# Patient Record
Sex: MALE | Race: WHITE
[De-identification: names, ages, dates, MRNs, and addresses within clinical notes are randomized per-mention and may not be internally consistent; named-entity substitution may affect disease eponyms.]

---

## 2020-08-05 ENCOUNTER — HOSPITAL ENCOUNTER (EMERGENCY)
Dept: HOSPITAL 74 - JER | Age: 25
Discharge: HOME | End: 2020-08-05
Payer: COMMERCIAL

## 2020-08-05 VITALS — HEART RATE: 89 BPM | DIASTOLIC BLOOD PRESSURE: 64 MMHG | SYSTOLIC BLOOD PRESSURE: 132 MMHG

## 2020-08-05 VITALS — BODY MASS INDEX: 18.1 KG/M2

## 2020-08-05 VITALS — TEMPERATURE: 98.5 F
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
## 2020-08-05 DIAGNOSIS — S43.004A: Primary | ICD-10-CM

## 2020-08-05 PROCEDURE — 3E033GC INTRODUCTION OF OTHER THERAPEUTIC SUBSTANCE INTO PERIPHERAL VEIN, PERCUTANEOUS APPROACH: ICD-10-PCS | Performed by: STUDENT IN AN ORGANIZED HEALTH CARE EDUCATION/TRAINING PROGRAM

## 2020-08-05 PROCEDURE — 0RSJXZZ REPOSITION RIGHT SHOULDER JOINT, EXTERNAL APPROACH: ICD-10-PCS | Performed by: STUDENT IN AN ORGANIZED HEALTH CARE EDUCATION/TRAINING PROGRAM

## 2020-08-05 NOTE — PDOC
Documentation entered by Kristine He SCRIBE, acting as scribe for 

Sneha Max MD.








Sneha Max MD:  This documentation has been prepared by the scribeNeri Ana, SCRIBE, under my direction and personally reviewed by me in its 

entirety.  I confirm that the documentation accurately reflects all work, 

treatment, procedures, and medical decision making performed by me.  





History of Present Illness





- General


Chief Complaint: Shoulder Dislocation


Stated Complaint: RT SHOULDER INJURY/PAIN


Time Seen by Provider: 08/05/20 11:19


History Source: Patient


Exam Limitations: No Limitations





- History of Present Illness


Initial Comments: 





08/05/20 11:31


Patient is a 25 year old male with a significant past medical history of brain 

tumor, epilepsy disorder, and multiple shoulder dislocation, who presents to the

ED with shoulder pain since 4 days ago. Patient stated he had a seizure on 

Saturday (4 days ago) and hit his shoulder. Patient disclosed he is in "a lot of

pain" and cannot move his arm. Patient said he writes with his right hand but is

left hand dominant. 





Patient denies: headache, SOB, chest pain, abdominal pain, or any other related 

symptoms.





Allergies: NKDA

















Past History





- Medical History


Allergies/Adverse Reactions: 


                                    Allergies











Allergy/AdvReac Type Severity Reaction Status Date / Time


 


No Known Allergies Allergy   Verified 08/05/20 11:08











Home Medications: 


Ambulatory Orders





levETIRAcetam [Keppra -] 500 mg PO BID 11/03/16 








COPD: No


Seizures: Yes





- Immunization History


Immunization Up to Date: Yes





- Psycho-Social/Smoking History


Smoking History: Never smoked


Have you smoked in the past 12 months: No


Information on smoking cessation initiated: No





- Substance Abuse Hx (Audit-C & DAST Scrn)


How often the patient has a drink containing alcohol: 2-4 times / month


Number of drinks the patient has on a typical day: 3 or 4


How often the patient has six or more drinks on one occasion: Never


Score: In Men: 4 or > Positive; In Women: 3 or > Positive: 3


Screen Result (Pos requires Nsg. Audit-10AR): Negative


In the last yr the pt used illegal drug/Rx for NonMed reason: No


Score:  Yes response is considered Positive: 0


Screen Result (Positive result requires Nsg. DAST-10): Negative





**Review of Systems





- Review of Systems


Able to Perform ROS?: Yes


Comments:: 





08/05/20 11:33


GENERAL/CONSTITUTIONAL: No fever or chills. No weakness.


HEAD, EYES, EARS, NOSE AND THROAT: No change in vision. No ear pain or 

discharge. No sore throat.


CARDIOVASCULAR: No chest pain or shortness of breath.


RESPIRATORY: No cough, wheezing, or hemoptysis.


GASTROINTESTINAL: No nausea, vomiting, diarrhea or constipation.


GENITOURINARY: No dysuria, frequency, or change in urination.


MUSCULOSKELETAL: +Shoulder pain. No neck or back pain.


SKIN: No rash


NEUROLOGIC: +Seizures. No headache, vertigo, loss of consciousness.


ENDOCRINE: No increased thirst. No abnormal weight change.


HEMATOLOGIC/LYMPHATIC: No anemia, easy bleeding, or history of blood clots.


ALLERGIC/IMMUNOLOGIC: No hives or skin allergy.




















*Physical Exam





- Vital Signs


                                Last Vital Signs











Temp Pulse Resp BP Pulse Ox


 


 98.5 F   73   18   122/84   100 


 


 08/05/20 11:05  08/05/20 11:05  08/05/20 11:05  08/05/20 11:05  08/05/20 11:05














- Physical Exam





08/05/20 11:34


GENERAL: Awake, alert, and fully oriented, in no acute distress


HEAD: No signs of trauma


EYES: EOMI, sclera anicteric, conjunctiva clear


ENT: Auricles normal inspection, hearing grossly normal, nares patent, 

oropharynx clear without exudates. Moist mucosa


NECK: Normal ROM, supple, no lymphadenopathy, JVD, or masses


LUNGS: Breath sounds equal, clear to auscultation bilaterally.  No wheezes, and 

no crackles


HEART: Regular rate and rhythm, normal S1 and S2


ABDOMEN: Soft, nontender, normoactive bowel sounds.  No guarding, no rebound.  

No masses


EXTREMITIES: ROM R shoulder limited in flexion/extension/abduction, holding RUE 

in adduction, +deformity near R shoulder, +decreased sensation to light touch 

over R shoulder, FROM at wrists and elbows b/l, +radial pulses


NEUROLOGICAL: Cranial nerves II through XII grossly intact.  Normal speech, 

normal gait


SKIN: Warm, Dry, normal turgor, no rashes or lesions noted.














Procedures





- Joint Reduction


  ** Right


Joint Reduction Site: right: Shoulder


Pre-Procedure NV Exam: abnormal (reports decreased sensation to light touch over

R shoulder)


Conscious Sedation: No


Reduction Attempts: 1


Anesthetic: 1% Lidocaine (10cc intraarticular lidocaine)


Amount (mL): 10


Procedure: Traction Counter Traction


Post-Procedure NV Exam: unchanged from pre procedure


Complications: No


Post Joint Reduction Film: joint reduced


Immobilized: Yes (sling placed)


Progress: 





08/05/20 13:40


Patient given 4mg morphine for pain and 25mg ketamine x2 for pain prior to 

procedure. Patient tolerated procedure well. 





Medical Decision Making





- Medical Decision Making





08/05/20 13:41


25 y M with likely R shoulder dislocation, xray done concerning for R shoulder 

dislocation, reports decreased sensation to light touch over deltoid but 

otherwise neurovascularly intact. 





Plan:


-shoulder reduced using intraarticular lidocaine and given moprhine and ketamine

for pain prior to reduction. Repeat xray shows shoulder reduced. Will d/c with 

return precautions, patient has ortho f/u with his orthopedist tomorrow





This clinical encounter is taking place during a federal and state health care 

emergency attributable to the novel Corona Virus pandemic.


The  of the Department of Health and Human Services has declared, 

pursuant to the Public Health Service Act  319F-3 (42 U.S.C.  247d-6d), that a

covered persons activities related to medical countermeasures against COVID-19 

will be immune from liability under Federal and State law.





Discharge





- Discharge Information


Problems reviewed: Yes


Clinical Impression/Diagnosis: 


Shoulder dislocation


Qualifiers:


 Encounter type: initial encounter Laterality: right Qualified Code(s): S43.004A

- Unspecified dislocation of right shoulder joint, initial encounter





Condition: Improved


Disposition: HOME





- Admission


No





- Follow up/Referral


Referrals: 


Duncan Abreu MD [Primary Care Provider] - 





- Patient Discharge Instructions


Patient Printed Discharge Instructions:  DI for Shoulder Dislocation


Additional Instructions: 


You were seen for a shoulder dislocation. Your shoulder was put back in place in

the emergency room. You should keep the right arm in a splint until you follow 

up with your orthopedist. Return to the ED for new or worsening symptoms. You 

can take tylenol or motrin at home as needed for pain. 








- Post Discharge Activity

## 2020-09-21 NOTE — XMS
Summarization Of Episode

                          Created on:2020



Patient:KIMBERLY WINSTON

Sex:Male

:1995

External Reference #:[id number]





Demographics







                          Address                   [address] [place] [address] [address] [place] [address]

 

                          Home Phone                [phone]

 

                          Preferred Language        English

 

                          Marital Status             or 

 

                          Jainism Affiliation     [place]

 

                          Race                      WH

 

                          Ethnic Group               or 









Author







                          Organization              [facility]









Support







                Name            Relationship    Address         Phone

 

                UE              Unavailable     Unavailable     Unavailable

 

                JAZMINE LUIS MOTHER          [address]  [phone]



                                                [place] [address] 

 

                JAZMINE LUIS Mother          [address] [facility] [address]

ilable



                                                [place] [address] 









Re-disclosure Warning

The records that you are about to access may contain information from federally-
assisted alcohol or drug abuse programs. If such information is present, then 
the following federally mandated warning applies: This information has been 
disclosed to you from records protected by federal confidentiality rules (42 CFR
part 2). The federal rules prohibit you from making any further disclosure of 
this information unless further disclosure is expressly permitted by the written
consent of the person to whom it pertains or as otherwise permitted by 42 CFR 
part 2. A general authorization for the release of medical or other information 
is NOT sufficient for this purpose. The Federal rules restrict any use of the 
information to criminally investigate or prosecute any alcohol or drug abuse 
patient.The records that you are about to access may contain highly sensitive 
health information, the redisclosure of which is protected by Article 27-F of 
the [facility] Public Health law. If you continue you may haveaccess to 
information: Regarding HIV / AIDS; Provided by facilities licensed or operated 
by the [facility] Office of Mental Health; or Provided by the [facility]
Office for People With Developmental Disabilities. If such information is 
present, then the following [place] mandated warning applies: This 
information has been disclosed to you from confidential records which are 
protected by state law. State law prohibits you from making any further 
disclosure of this information without the specific written consent of the 
person to whom it pertains, or as otherwise permitted by law. Any unauthorized 
further disclosure in violation of state law may result in a fine or retirement 
sentence or both. A general authorization for the release of medical or other 
information is NOT sufficient authorization for further disclosure.



Insurance Providers







          Payer name Policy type Policy ID Covered   Covered party's Policy    P

marsha



                    / Coverage           party ID  relationship to Veliz    Inf

ormation



                    type                          veliz              

 

          FAMILIA             [id number]                             [id number]

[id number]



          [facility] NON                                                   



          CAP

## 2020-09-28 ENCOUNTER — HOSPITAL ENCOUNTER (OUTPATIENT)
Dept: HOSPITAL 74 - FASUSAT | Age: 25
LOS: 1 days | Discharge: HOME | End: 2020-09-29
Attending: ORTHOPAEDIC SURGERY
Payer: COMMERCIAL

## 2020-09-28 VITALS — BODY MASS INDEX: 18.1 KG/M2

## 2020-09-28 DIAGNOSIS — M85.812: ICD-10-CM
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
## 2020-09-28 DIAGNOSIS — M25.312: Primary | ICD-10-CM
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
## 2020-09-28 DIAGNOSIS — S42.292A: ICD-10-CM

## 2020-09-28 DIAGNOSIS — M65.812: ICD-10-CM

## 2020-09-28 PROCEDURE — 0RQK0ZZ REPAIR LEFT SHOULDER JOINT, OPEN APPROACH: ICD-10-PCS | Performed by: ORTHOPAEDIC SURGERY

## 2020-09-28 PROCEDURE — 0QBH0ZZ EXCISION OF LEFT TIBIA, OPEN APPROACH: ICD-10-PCS | Performed by: ORTHOPAEDIC SURGERY

## 2020-09-28 PROCEDURE — 0PU807Z SUPPLEMENT LEFT GLENOID CAVITY WITH AUTOLOGOUS TISSUE SUBSTITUTE, OPEN APPROACH: ICD-10-PCS | Performed by: ORTHOPAEDIC SURGERY

## 2020-09-28 PROCEDURE — 0RBK4ZZ EXCISION OF LEFT SHOULDER JOINT, PERCUTANEOUS ENDOSCOPIC APPROACH: ICD-10-PCS | Performed by: ORTHOPAEDIC SURGERY

## 2020-09-28 RX ADMIN — LEVETIRACETAM SCH MG: 500 TABLET, FILM COATED ORAL at 20:21

## 2020-09-28 RX ADMIN — ACETAMINOPHEN ONE MG: 10 INJECTION, SOLUTION INTRAVENOUS at 17:55

## 2020-09-28 NOTE — OPR
Procedure: Left Shoulder-


1.  Diagnostic arthroscopy.


2.  Arthroscopic limited debridement of glenohumeral joint (54309).


3.  Open capsulorrhaphy with capsular plication and anterior glenoid 

reconstruction with fresh distal tibial osteochondral allograft (96057).





Preoperative Diagnoses: 


1.  Recurrent anterior shoulder instability.


2.  Glenoid bone loss.  


3.  Hill-Sachs lesion.





Postoperative Diagnoses: 


1.  Recurrent anterior shoulder instability.


2.  Glenoid bone loss.  


3.  Hill-Sachs lesion.


4.  Glenohumeral synovitis.





Surgeon: Tom Kent DO





Assistants: Vj Blanco DO





Anesthesia: General Anesthesia and Local infiltration analgesic with 

Ropivicaine: suprascapular nerve, axillary nerve, local superficial field





Estimated Blood Loss: 25 mL


       


Drains: None


       


Total IV Fluids: Per anesthesia record


       


Specimens: None


       


Implants: (2) 3.75 mm cannulated fully treaded screw with washer (Arthrex) 





Complications: None


       


Disposition: PACU


       


Condition: Hemodynamically stable





Indications: Layo Reynolds presented to us with chronic left shoulder 

pain and recurrent anterior instability that failed conservative measures. His 

symptoms, signs, and imaging were consistent with the above noted diagnosis. He 

ultimately elected to proceed with surgical intervention after discussion of the

risks, benefits, and alternatives. A CT scan with 3D reconstructions was 

performed and showed a large Hill-Sachs lesion and over 25% anterior glenoid 

bone loss. Given the extensive bone loss and multiple recurrences, a soft tissue

procedure was deemed to be insufficient for the goal of long term stability of 

the shoulder. Thus, a bony augmentation procedure was chosen. We discussed risks

including but not limited to, bleeding, pain, infection, scarring, damage to 

neurovascular structures, blood clots, pulmonary embolus, need for additional 

surgery, incomplete relief of pain, and incomplete return of function. We also 

discussed the risks of using fresh allograft including disease transmission. He 

expressed understanding and wished to proceed.


 


He underwent preoperative medical and neurological evaluation, clearance and 

optimization prior to surgery.





Procedure Details: 


He was identified in the preoperative area. The left shoulder was marked as the 

operative site and consent was completed and confirmed. 


 


He was later transferred to the operating room and placed in supine position the

operating room. Anesthesia was induced without difficulty. He was repositioned 

into beach chair with all bony prominences appropriately padded. The neck was in

neutral alignment. 





The left upper extremity was prepped and draped in standard sterile fashion.


 


A surgical time-out was performed identifying the correct patient, procedure, 

and site.


 


Antibiotics were given within 1 hour prior to surgical incision.





Examination under anesthesia: Passive range of motion of the left shoulder 

showed forward elevation of 150, abduction 90, external rotation at side 50, 

SABER 80, and SABIR 40. This was compared to his contralateral shoulder which 

shows forward elevation of 170, abduction 100 external rotation at side 60, 

SABER 90, and SABIR 40. There was grade 3+ anterior instability, grade 0 

posterior instability, and mild sulcus.





Diagnostic arthroscopy: We began the procedure with the standard posterolateral 

portal, entered the glenohumeral joint, and an anterior portal was made within 

the rotator cuff interval under direct visualization with the assistance of a 

spinal needle. A probe was used to assist with diagnostic arthroscopy and we 

visualized from both posteriorly and anteriorly.





Evaluation of the glenohumeral joint showed moderate synovitis anteriorly and 

superiorly. The superior labrum was intact. The anterior labrum showed a Bankart

tear with significant glenoid bone loss measuring 8 mm. There was deficient 

capsulolabral tissue that was torn, and partially scarred to the medial aspect 

of the glenoid neck. The posterior labrum was probed and found to be intact. 

There were no loose bodies in the inferior pouch.  There was no HAGL lesion. The

biceps tendon showed hyperemia. The rotator cuff interval was normal. The 

axillary recess was empty. The subscapularis was probed and found to be intact. 

The articular surface of the supraspinatus was intact. The articular surface of 

the  infraspinatus and teres minor tendons were intact. The anterior aspect of 

the glenoid showed grade II and III chondromalacia. The humeral head showed a 

large Hill-Sachs lesion posteriorly.





Arthroscopic limited debridement of glenohumeral joint: We used a combination of

the arthroscopic motorized shaver and radiofrequency device to perform a 

debridement inside the glenohumeral joint anteriorly and superiorly. The 

hyperemia, erythema, and synovitis of the joint and joint capsule was debrided. 

Synovitic fronds were thermally ablated. Chondral degeneration was debrided to a

stable edge. Labral fraying and degeneration was also resected and debrided to a

stable edge anteriorly. 





Open capsulorrhaphy with capsular plication and anterior glenoid reconstruction 

using fresh distal tibial osteochondral allograft: We began the open procedure 

with incision from the inferior tip of the coracoid approximately 8 cm inferior 

towards the axillary fold. Sharp dissection was carried out through the 

subcutaneous tissues. Self-retaining retractors were placed. Subcutaneous flaps 

were developed. Deltopectoral interval was identified and cephalic vein was also

identified, protected, and mobilized laterally. We developed a subdeltoid and 

subpectoral space, here we identified the coracoid process. Retractors were 

placed superiorly. We divided the clavipectoral fascia lateral to the conjoint 

tendon and identified the CA ligament. Once self-retaining retractors were 

placed below the conjoint tendon, we externally rotated the arm bringing the 

subscapularis muscle into view. We removed the bursal tissue over the 

subscapularis tendon and divided subscapularis inline with the muscle fibers at 

its approximate midpoint thorough natural raphe. Curved Saeed scissor was used to

do this and was done sharply extending laterally into the lesser tuberosity 

insertion. We identified the underlying capsular tissue and dissected the 

muscular tissue above this and away from capsular tissue. We used a lap sponge 

and pushed it into the space above the capsule underneath the subscapularis 

muscle developing a pocket space in front of the anterior glenoid and scapular 

neck. We then placed anterior glenoid retractor here and blunt Hohmann 

inferiorly underneath the glenoid. We were able to palpate the axillary nerve 

and protect it throughout the case. Superior retraction was achieved with 

appendiceal retractor and self retaining retractors. We were then able to 

visualize the anterior capsular tissue that was clearly lax.  We used a 15 blade

to make a horizontal transection of the anterior capsule at approximately the 3 

o'clock position. We identified superior and inferior leaflets for later 

plication. We inserted a Fukuda retractor to retract the humeral head laterally.

We released the anterior-inferior aspect of the capsule sharply and with an 

elevator.  Here we could clearly see an anterior-inferior glenoid bony defect 

approximately 25-30% of the glenoid face that was impacted and crushed in more 

medially in the anterior scapular neck. We used curved osteotome and gloria to 

freshen the surface of the anterior scapular neck in preparation for receiving 

the bone graft.  





We turned our attention to preparation of the fresh distal tibial osteochondral 

allograft which was prepared on the back table. The graft was removed from its s

geremias bath and placed on an allograft work station. The lateral 1/3 of the tibial

plafond was measured and marked to accommodate the glenoid defect. The graft was

cut with a thin blade sagittal saw. Frequent irrigation was used to prevent 

thermal necrosis. The allograft was cut medially and a 10-15 degree angle to 

accommodate the glenoid slope and restore the natural concavity. The superior 

and inferior aspects of the graft were rounded to match the native glenoid 

contour. The graft measured 1 cm deep medial to lateral, 22 mm long superior to 

inferior, and 8 mm wide anterior to posterior articular surface. Two 4 mm  

holes were created through the width of the graft. Before fixation, the graft 

was copiously irrigated with 3 liters of normal saline using pulsed lavage. The 

graft was transferred to the native glenoid to assess the fit and the congruity.

K-wires were driven into the glenoid for provisional fixation. We measured the 

screw lengths and found 36 mm screws to be appropriate. A 2.7 mm cannulated 

drill was used to over drill the distal cortex. Two 36 mm, 3.75 mm fully-

threaded screws were placed in a lag fashion with washers.  These were tightened

down with 2 finger tightness. This achieved excellent fixation of our bone graft

into the anterior scapular neck.  It was flush with the glenoid face and did not

extend prominently. Sutures attached to the washer were used to repair the 

labrum and capsule. 





We turned our attention to capsular plication, taking the superior leaflet of 

the capsule, bringing it inferiorly and suturing this into the inferior capsular

leaflet in pants over vest fashion. This additionally tightened the capsule in 

superior to inferior fashion. This achieved excellent recreation of the anterior

capsular tissues and tension. We then closed the subscapularis split laterally 

with interrupted figure-of-eight stitches. The shoulder was noted to be stable. 

There was no undue tension on the brachial plexus or nerves. 





Wound closure: We thoroughly irrigated the wounds and closed the wound in a 

layered fashion.  The deltopectoral interval was closed with running #2 Ethibond

stitch followed by buried 2-0 Vicryl deep tissues in an interrupted fashion.  We

then closed the skin with 3-0 Monocryl and Dermabond.  The shoulder was 

sterilely dressed and placed in a shoulder immobilizer.





Attestation for first assistant: Dr. Vj Blanco acted as the first assistant. 

There was no qualified resident or physician assistant available to do so.





Post-operative Details: I spoke with the family and patient regarding the 

operation after surgery. Postoperative examination showed a normal neurovascular

exam.  


 


Postoperative rehabilitation: Anterior Open Stabilizaton Protocol. He will 

remain in a shoulder immobilizer for 4-6 weeks. Early passive range of motion 

okay with limit of forward elevation 90 and external rotation 30 and advance as 

tolerated.

## 2020-09-29 VITALS — SYSTOLIC BLOOD PRESSURE: 127 MMHG | HEART RATE: 77 BPM | TEMPERATURE: 98.3 F | DIASTOLIC BLOOD PRESSURE: 80 MMHG

## 2020-09-29 RX ADMIN — CEFAZOLIN SODIUM SCH MLS/HR: 1 INJECTION, SOLUTION INTRAVENOUS at 08:30

## 2020-09-29 RX ADMIN — ACETAMINOPHEN SCH MG: 325 TABLET ORAL at 11:50

## 2020-09-29 RX ADMIN — ACETAMINOPHEN SCH: 325 TABLET ORAL at 03:41

## 2020-09-29 RX ADMIN — ACETAMINOPHEN ONE: 10 INJECTION, SOLUTION INTRAVENOUS at 07:58

## 2020-09-29 RX ADMIN — LEVETIRACETAM SCH MG: 500 TABLET, FILM COATED ORAL at 08:29

## 2020-09-29 RX ADMIN — ACETAMINOPHEN SCH MG: 325 TABLET ORAL at 05:58

## 2020-09-29 RX ADMIN — CEFAZOLIN SODIUM SCH MLS/HR: 1 INJECTION, SOLUTION INTRAVENOUS at 01:30

## 2020-09-29 NOTE — PN
Progress Note (short form)





- Note


Progress Note: 





25M POD1 s/p left open shoulder capsulorrhaphy, anterior bone block, remplissage

under GA and nerve block.


Pt states pain is well controlled and reports no anesthetic complications.


AVSS.


Continue current regimen.

## 2020-09-29 NOTE — PN
Progress Note (short form)





- Note


Progress Note: 





                        ORTHOPEDIC SURGERY PROGRESS NOTE


                             Department of Orthopedic Surgery





SUBJECTIVE





No acute events overnight. No complaints currently. Denies chest pain, shortness

of breath, or calf pain. No nausea or vomiting. Tolerating oral intake. Pain 

controlled.





PHYSICAL EXAMINATION


General: Alert, oriented, cooperative and no distress.





Upper Extremity: Dressing intact; Skin intact, no lesions, rashes or ulcers 

noted. Muscle mass equal and symmetric to contralateral side. No atrophy noted. 

No masses or effusions noted. No tenderness to palpation. No pain with gentle 

passive and active ROM. M/R/U/MSK/AX motor intact; SILT distally; 2+ radial 

pulses; Cap refill brisk.





DVT Exam: No evidence of DVT seen on physical exam; No cords or calf tenderness;

No significant calf/ankle edema.





Intake & Output











 09/27/20 09/28/20 09/29/20





 23:59 23:59 23:59


 


Intake Total  2500 1000


 


Output Total  550 


 


Balance  1950 1000


 


Intake:   


 


  IV  1900 900


 


    Ofirmev Injection - 100   900





    ml @ 0 mls/hr IVPB .STK-   





    [facility] Rx#:[id number]   


 


  IVPB   100


 


  Oral  600 


 


Output:   


 


  Urine  550 


 


    Void  550 


 


Other:   


 


  Voiding Method  Urinal Toilet


 


  # Unmeasured Voids   


 


    Void   1


 


  Bowel Movement   No


 


  Weight  106 lb 


 


  Height  5 ft 4 in 


 


  Body Mass Index (BMI)  18.1 


 


  Weight Measurement Method  Stated by Patient 








Active Medications











Generic Name Dose Route Start Last Admin





  Trade Name Freq  PRN Reason Stop Dose Admin


 


Acetaminophen  650 mg  09/29/20 00:05  09/29/20 05:58





  Tylenol -  PO  10/01/20 19:25  650 mg





  Q6H BELL   Administration


 


Cefazolin Sodium  1 gm in 50 mls @ 100 mls/hr  09/29/20 01:30  09/29/20 01:30





  Ancef 1 Gm Premixed Ivpb -  IVPB  09/29/20 13:59  100 mls/hr





  Q6H BELL   Administration


 


Levetiracetam  500 mg  09/28/20 20:00  09/28/20 20:21





  Keppra -  PO   500 mg





  Q12H BELL   Administration


 


Ondansetron HCl  4 mg  09/28/20 12:32  09/28/20 18:11





  Zofran Injection  IVPUSH   4 mg





  Q6H PRN   Administration





  NAUSEA  


 


Oxycodone HCl  5 mg  09/28/20 17:57  09/29/20 05:57





  Roxicodone -  PO   5 mg





  Q4H PRN   Administration





  PAIN LEVEL 1-5  


 


Oxycodone HCl  10 mg  09/28/20 17:57  09/28/20 20:47





  Roxicodone -  PO   10 mg





  Q4H PRN   Administration





  PAIN LEVEL 6-10  








Vital Signs (last)











Temp Pulse Resp BP Pulse Ox


 


 98.3 F   77   18   127/80   100 


 


 09/29/20 06:28  09/29/20 06:28  09/29/20 06:28  09/29/20 06:28 09/29/20 07:55














IMAGING


Radiographs of the left shoulder shows implant and metal hardware in good 

position with no signs of failure.





ASSESSMENT AND PLAN





Layo Reynolds is a 25 year old male status post left shoulder open stabi

lization with fresh distal tibia osteochondral allograft. POD#1





Doing well. 





- Pain control


- SCDs, early ambulation


- Ice/Elevation


- Elevate HOB, encourage oral intake


- NWB LUE; Shoulder immobilizer at all times


- DC today when ready